# Patient Record
Sex: MALE | Race: WHITE | NOT HISPANIC OR LATINO | Employment: FULL TIME | URBAN - METROPOLITAN AREA
[De-identification: names, ages, dates, MRNs, and addresses within clinical notes are randomized per-mention and may not be internally consistent; named-entity substitution may affect disease eponyms.]

---

## 2019-06-18 DIAGNOSIS — M79.642 LEFT HAND PAIN: Primary | ICD-10-CM

## 2019-06-19 ENCOUNTER — OFFICE VISIT (OUTPATIENT)
Dept: ORTHOPEDICS | Facility: CLINIC | Age: 34
End: 2019-06-19
Payer: COMMERCIAL

## 2019-06-19 ENCOUNTER — HOSPITAL ENCOUNTER (OUTPATIENT)
Dept: RADIOLOGY | Facility: HOSPITAL | Age: 34
Discharge: HOME | End: 2019-06-19
Attending: ORTHOPAEDIC SURGERY
Payer: COMMERCIAL

## 2019-06-19 ENCOUNTER — TELEPHONE (OUTPATIENT)
Dept: ORTHOPEDICS | Facility: CLINIC | Age: 34
End: 2019-06-19

## 2019-06-19 VITALS
WEIGHT: 174 LBS | HEIGHT: 71 IN | DIASTOLIC BLOOD PRESSURE: 83 MMHG | BODY MASS INDEX: 24.36 KG/M2 | HEART RATE: 84 BPM | SYSTOLIC BLOOD PRESSURE: 129 MMHG

## 2019-06-19 DIAGNOSIS — S63.592A SPRAIN OF ULNAR COLLATERAL LIGAMENT OF LEFT WRIST, INITIAL ENCOUNTER: Primary | ICD-10-CM

## 2019-06-19 DIAGNOSIS — M79.642 LEFT HAND PAIN: ICD-10-CM

## 2019-06-19 PROCEDURE — 99204 OFFICE O/P NEW MOD 45 MIN: CPT | Mod: S$GLB,,, | Performed by: ORTHOPAEDIC SURGERY

## 2019-06-19 PROCEDURE — 73130 XR HAND COMPLETE 3 VIEW LEFT: ICD-10-PCS | Mod: 26,LT,, | Performed by: RADIOLOGY

## 2019-06-19 PROCEDURE — 99204 PR OFFICE/OUTPT VISIT, NEW, LEVL IV, 45-59 MIN: ICD-10-PCS | Mod: S$GLB,,, | Performed by: ORTHOPAEDIC SURGERY

## 2019-06-19 PROCEDURE — 73130 X-RAY EXAM OF HAND: CPT | Mod: TC,LT

## 2019-06-19 PROCEDURE — 99999 PR PBB SHADOW E&M-EST. PATIENT-LVL III: CPT | Mod: PBBFAC,,, | Performed by: ORTHOPAEDIC SURGERY

## 2019-06-19 PROCEDURE — 99999 PR PBB SHADOW E&M-EST. PATIENT-LVL III: ICD-10-PCS | Mod: PBBFAC,,, | Performed by: ORTHOPAEDIC SURGERY

## 2019-06-19 PROCEDURE — 3008F PR BODY MASS INDEX (BMI) DOCUMENTED: ICD-10-PCS | Mod: CPTII,S$GLB,, | Performed by: ORTHOPAEDIC SURGERY

## 2019-06-19 PROCEDURE — 3008F BODY MASS INDEX DOCD: CPT | Mod: CPTII,S$GLB,, | Performed by: ORTHOPAEDIC SURGERY

## 2019-06-19 PROCEDURE — 73130 X-RAY EXAM OF HAND: CPT | Mod: 26,LT,, | Performed by: RADIOLOGY

## 2019-06-19 RX ORDER — MELOXICAM 15 MG/1
15 TABLET ORAL DAILY
Qty: 30 TABLET | Refills: 2 | Status: SHIPPED | OUTPATIENT
Start: 2019-06-19 | End: 2020-02-14

## 2019-06-19 RX ORDER — FINASTERIDE 5 MG/1
TABLET, FILM COATED ORAL
COMMUNITY
Start: 2019-03-20 | End: 2022-02-24 | Stop reason: DRUGHIGH

## 2019-06-19 NOTE — TELEPHONE ENCOUNTER
Pt called to clarify which pharmacy his medication went to. I informed the pt that his medications were located at our pharmacy [facility] . PT understood.

## 2019-06-19 NOTE — PROGRESS NOTES
Subjective:     Patient ID: Tyrel Rogers is a 34 y.o. male.    Chief Complaint: No chief complaint on file.    PT stated the he was out on his 5/19/2019 he hurt his hand when falling on it. Taking anti-inflammatories without significant improvement.    Hand Pain    The pain is present in the left hand. This is a new problem. The current episode started 1 to 4 weeks ago. The injury was the result of a pushing and falling action The problem occurs intermittently. The problem has been resolved. The quality of the pain is described as aching, sharp, shooting and throbbing. The pain is at a severity of 5/10. Associated symptoms include a limited range of motion and numbness. Pertinent negatives include no fever. The symptoms are aggravated by activity, rotation, twisting, flexion, bending and contact. He has tried OTC pain meds for the symptoms. The treatment provided mild relief. Physical therapy was not tried.      History reviewed. No pertinent past medical history.  History reviewed. No pertinent surgical history.  History reviewed. No pertinent family history.  Social History     Socioeconomic History    Marital status: Single     Spouse name: Not on file    Number of children: Not on file    Years of education: Not on file    Highest education level: Not on file   Occupational History    Not on file   Social Needs    Financial resource strain: Not on file    Food insecurity:     Worry: Not on file     Inability: Not on file    Transportation needs:     Medical: Not on file     Non-medical: Not on file   Tobacco Use    Smoking status: Never Smoker    Smokeless tobacco: Never Used   Substance and Sexual Activity    Alcohol use: Not Currently    Drug use: Never    Sexual activity: Not Currently   Lifestyle    Physical activity:     Days per week: Not on file     Minutes per session: Not on file    Stress: Not on file   Relationships    Social connections:     Talks on phone: Not on file      Gets together: Not on file     Attends Christian service: Not on file     Active member of club or organization: Not on file     Attends meetings of clubs or organizations: Not on file     Relationship status: Not on file   Other Topics Concern    Not on file   Social History Narrative    Not on file       Review of patient's allergies indicates:  No Known Allergies  Review of Systems   Constitution: Negative for fever and night sweats.   HENT: Negative for hearing loss.    Eyes: Negative for blurred vision and visual disturbance.   Cardiovascular: Negative for chest pain and leg swelling.   Respiratory: Negative for shortness of breath.    Endocrine: Negative for polyuria.   Hematologic/Lymphatic: Negative for bleeding problem.   Skin: Negative for rash.   Musculoskeletal: Negative for back pain, joint pain, joint swelling, muscle cramps and muscle weakness.   Gastrointestinal: Negative for melena.   Genitourinary: Negative for hematuria.   Neurological: Positive for numbness. Negative for loss of balance and paresthesias.   Psychiatric/Behavioral: Negative for altered mental status.       Objective:   Body mass index is 24.27 kg/m².  Vitals:    06/19/19 1504   BP: 129/83   Pulse: 84           General    Vitals reviewed.  Constitutional: He is oriented to person, place, and time. He appears well-developed and well-nourished. No distress.   HENT:   Right Ear: External ear normal.   Left Ear: External ear normal.   Nose: Nose normal.   Eyes: Pupils are equal, round, and reactive to light. Right eye exhibits no discharge. Left eye exhibits no discharge.   Neck: Normal range of motion.   Pulmonary/Chest: Effort normal. No respiratory distress.   Abdominal: Soft.   Neurological: He is alert and oriented to person, place, and time. No cranial nerve deficit. He exhibits normal muscle tone. Coordination normal.   Psychiatric: He has a normal mood and affect. His behavior is normal. Judgment and thought content normal.              Right Hand/Wrist Exam     Inspection   Scars: Wrist - absent   Effusion: Wrist - absent   Bruising: Wrist - absent   Deformity: Wrist - deformity     Range of Motion     Wrist   Extension:  50 normal   Flexion:  70 normal   Abduction: 20 normal  Adduction: 35 normal    Tests   Phalens Sign: negative  Tinel's sign (median nerve): negative  Finkelstein's test: negative  Carpal Tunnel Compression Test: negative  Cubital Tunnel Compression Test: negative      Other     Neuorologic Exam    Median Distribution: normal  Ulnar Distribution: normal  Radial Distribution: normal      Left Hand/Wrist Exam     Inspection   Scars: Wrist - absent   Effusion: Wrist - absent   Bruising: Wrist - absent   Deformity: Wrist - absent     Range of Motion     Wrist   Extension:  50 normal   Flexion:  70 normal   Abduction: 20 normal  Adduction: 35 normal    Tests   Phalens Sign: negative  Tinel's sign (median nerve): negative  Finkelstein's test: negative  Carpal Tunnel Compression Test: negative  Cubital Tunnel Compression Test: negative      Other     Sensory Exam  Median Distribution: normal  Ulnar Distribution: normal  Radial Distribution: normal    Comments:  Tender to palpation over ulnar collateral ligament of the thumb.  Positive pain with stress      Right Elbow Exam     Inspection   Scars: absent  Effusion: absent  Bruising: absent  Deformity: absent  Atrophy: absent    Range of Motion   Extension:  0 normal   Flexion:  130 normal   Pronation: normal   Supination:  80 normal     Tests   Varus: negative  Valgus: negative  Tinel's sign (cubital tunnel): negative  Tennis Elbow: negative  Golfer's Elbow: negative  Radial Capitellar Grind: negative    Other   Sensation: normal      Left Elbow Exam     Inspection   Scars: absent  Effusion: absent  Bruising: absent  Deformity: absent  Atrophy: absent    Range of Motion   Extension:  0 normal   Flexion:  130 normal   Pronation: normal   Supination:  80 normal     Tests    Varus: negative  Tinel's sign (cubital tunnel): negative  Tennis Elbow: negative  Golfer's Elbow: negative  Radial Capitellar Grind: negative    Other   Sensation: normal        Muscle Strength   Right Upper Extremity   Wrist extension: 5/5/5   Wrist flexion: 5/5/5   : 5/5/5   Pinch Mechanism: 5/5  Elbow Pronation:  5/5   Elbow Supination:  5/5   Elbow Extension: 5/5  Elbow Flexion: 5/5  Intrinsics: 5/5  EPL (Extensor Pollicis Longus): 5/5  Left Upper Extremity  Wrist extension: 5/5/5   Wrist flexion: 5/5/5   :  5/5/5   Pinch Mechanism: 5/5  Elbow Pronation:  5/5   Elbow Supination:  5/5   Elbow Extension: 5/5  Elbow Flexion: 5/5  Intrinsics: 5/5  EPL (Extensor Pollicis Longus): 5/5    Vascular Exam     Right Pulses      Radial:                    2+      Left Pulses      Radial:                    2+      Capillary Refill  Right Hand: normal capillary refill  Left Hand: normal capillary refill    Edema  Right Forearm: absent  Left Forearm: absent      Imaging reviewed and interpreted today by me X-Ray Hand Complete Left  Narrative: EXAMINATION:  XR HAND COMPLETE 3 VIEW LEFT    CLINICAL HISTORY:  . Pain in left hand    TECHNIQUE:  PA, lateral, and oblique views of the left hand were performed.    COMPARISON:  None    FINDINGS:  No acute osseous or soft tissue abnormality.  Impression: As above    Electronically signed by: Felton Huitron MD  Date:    06/19/2019  Time:    14:38      Assessment:     Encounter Diagnosis   Name Primary?    Sprain of ulnar collateral ligament of left wrist, initial encounter Yes        Plan:     -MRI left hand to evaluate UCL  -thumb spica splint  -Mobic      Disclaimer: This note was prepared using a voice recognition system and is likely to have sound alike errors within the text.

## 2019-07-02 ENCOUNTER — TELEPHONE (OUTPATIENT)
Dept: RADIOLOGY | Facility: HOSPITAL | Age: 34
End: 2019-07-02

## 2019-07-02 ENCOUNTER — TELEPHONE (OUTPATIENT)
Dept: ORTHOPEDICS | Facility: CLINIC | Age: 34
End: 2019-07-02

## 2019-08-12 ENCOUNTER — OFFICE VISIT (OUTPATIENT)
Dept: URGENT CARE | Facility: CLINIC | Age: 34
End: 2019-08-12
Payer: COMMERCIAL

## 2019-08-12 VITALS
HEART RATE: 91 BPM | SYSTOLIC BLOOD PRESSURE: 136 MMHG | WEIGHT: 174 LBS | HEIGHT: 71 IN | BODY MASS INDEX: 24.36 KG/M2 | OXYGEN SATURATION: 97 % | RESPIRATION RATE: 16 BRPM | TEMPERATURE: 98 F | DIASTOLIC BLOOD PRESSURE: 94 MMHG

## 2019-08-12 DIAGNOSIS — S20.212A CONTUSION OF RIB ON LEFT SIDE, INITIAL ENCOUNTER: ICD-10-CM

## 2019-08-12 DIAGNOSIS — S22.42XA CLOSED FRACTURE OF MULTIPLE RIBS OF LEFT SIDE, INITIAL ENCOUNTER: Primary | ICD-10-CM

## 2019-08-12 DIAGNOSIS — T14.90XA INJURY: ICD-10-CM

## 2019-08-12 DIAGNOSIS — R07.81 RIB PAIN: ICD-10-CM

## 2019-08-12 PROCEDURE — 71100 X-RAY EXAM RIBS UNI 2 VIEWS: CPT | Mod: LT,S$GLB,, | Performed by: RADIOLOGY

## 2019-08-12 PROCEDURE — 71100 XR RIBS 2 VIEW LEFT: ICD-10-PCS | Mod: LT,S$GLB,, | Performed by: RADIOLOGY

## 2019-08-12 PROCEDURE — 99203 PR OFFICE/OUTPT VISIT, NEW, LEVL III, 30-44 MIN: ICD-10-PCS | Mod: S$GLB,,, | Performed by: NURSE PRACTITIONER

## 2019-08-12 PROCEDURE — 99203 OFFICE O/P NEW LOW 30 MIN: CPT | Mod: S$GLB,,, | Performed by: NURSE PRACTITIONER

## 2019-08-12 RX ORDER — NAPROXEN 500 MG/1
500 TABLET ORAL 2 TIMES DAILY WITH MEALS
Qty: 20 TABLET | Refills: 0 | Status: SHIPPED | OUTPATIENT
Start: 2019-08-12 | End: 2019-08-22

## 2019-08-12 RX ORDER — METRONIDAZOLE 10 MG/G
GEL TOPICAL
COMMUNITY
Start: 2019-02-26 | End: 2020-02-14

## 2019-08-12 NOTE — PROGRESS NOTES
"Subjective:       Patient ID: Tyrel Rogers is a 34 y.o. male.    Vitals:    08/12/19 1838   BP: (!) 136/94   Pulse: 91   Resp: 16   Temp: 97.5 °F (36.4 °C)   TempSrc: Oral   SpO2: 97%   Weight: 78.9 kg (174 lb)   Height: 5' 11" (1.803 m)       Chief Complaint: Rib Injury (Left Ribs)    Patient reports 4 days ago he was doing a tactic defense training through work and had to be maced and then tackled and injured his left ribs with residual pain and swelling.  No bruising.  No history of previous injury.  Works [employer].  Denies shortness of breath, fever, or cough.    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  [facility]    Chest Pain    This is a new problem. Episode onset: 4 days ago. The problem occurs intermittently. The pain is at a severity of 7/10. The quality of the pain is described as sharp and pressure. The pain does not radiate. Pertinent negatives include no abdominal pain, back pain, claudication, cough, diaphoresis, dizziness, exertional chest pressure, fever, headaches, hemoptysis, irregular heartbeat, leg pain, lower extremity edema, malaise/fatigue, nausea, near-syncope, numbness, orthopnea, palpitations, PND, shortness of breath, sputum production, syncope, vomiting or weakness. He has tried acetaminophen (ice) for the symptoms.     Review of Systems   Constitution: Negative for chills, diaphoresis, fever and malaise/fatigue.   HENT: Negative for sore throat.    Eyes: Negative for blurred vision.   Cardiovascular: Positive for chest pain. Negative for claudication, irregular heartbeat, near-syncope, orthopnea, palpitations, paroxysmal nocturnal dyspnea and syncope.   Respiratory: Negative for cough, hemoptysis, shortness of breath and sputum production.    Skin: Negative for rash.   Musculoskeletal: Negative for back pain and joint pain.        Left rib pain   Gastrointestinal: Negative for " abdominal pain, diarrhea, nausea and vomiting.   Neurological: Negative for dizziness, headaches, numbness and weakness.   Psychiatric/Behavioral: The patient is not nervous/anxious.        Objective:      Physical Exam   Constitutional: He is oriented to person, place, and time. He appears well-developed and well-nourished. He is cooperative.  Non-toxic appearance. He does not appear ill. No distress.   HENT:   Head: Normocephalic and atraumatic.   Right Ear: Hearing, tympanic membrane, external ear and ear canal normal.   Left Ear: Hearing, tympanic membrane, external ear and ear canal normal.   Nose: Nose normal. No mucosal edema, rhinorrhea or nasal deformity. No epistaxis. Right sinus exhibits no maxillary sinus tenderness and no frontal sinus tenderness. Left sinus exhibits no maxillary sinus tenderness and no frontal sinus tenderness.   Mouth/Throat: Uvula is midline, oropharynx is clear and moist and mucous membranes are normal. No trismus in the jaw. Normal dentition. No uvula swelling. No posterior oropharyngeal erythema.   Eyes: Conjunctivae and lids are normal. Right eye exhibits no discharge. Left eye exhibits no discharge. No scleral icterus.   Sclera clear bilat   Neck: Trachea normal, normal range of motion, full passive range of motion without pain and phonation normal. Neck supple.   Cardiovascular: Normal rate, regular rhythm, normal heart sounds, intact distal pulses and normal pulses.   Pulmonary/Chest: Effort normal and breath sounds normal. No respiratory distress. He has no wheezes. Chest wall is not dull to percussion. He exhibits tenderness and bony tenderness. He exhibits no mass, no laceration, no crepitus, no edema, no deformity, no swelling and no retraction.       Abdominal: Soft. Normal appearance and bowel sounds are normal. He exhibits no distension, no pulsatile midline mass and no mass. There is no tenderness.   Musculoskeletal: Normal range of motion. He exhibits no edema or  deformity.   Neurological: He is alert and oriented to person, place, and time. He exhibits normal muscle tone. Coordination normal.   Skin: Skin is warm, dry and intact. He is not diaphoretic. No pallor.   Psychiatric: He has a normal mood and affect. His speech is normal and behavior is normal. Judgment and thought content normal. Cognition and memory are normal.   Nursing note and vitals reviewed.      X-ray Ribs 2 View Left    Result Date: 8/12/2019  EXAMINATION: XR RIBS 2 VIEW LEFT CLINICAL HISTORY: Pleurodynia TECHNIQUE: Two views of the left ribs were performed. COMPARISON: None. FINDINGS: Four views. Remote appearing fractures noted of left posterolateral ribs 5 and 6.  No acute displaced fracture or dislocation of the visualized ribs.  The left lung zones are grossly clear.     1. No acute displaced fracture or dislocation of the left ribs noting remote fractures of left posterolateral ribs 5 and 6. Electronically signed by: Patricio Mario MD Date:    08/12/2019 Time:    19:01  Assessment:       1. Closed fracture of multiple ribs of left side, initial encounter    2. Rib pain    3. Injury    4. Contusion of rib on left side, initial encounter        Plan:       Tyrel was seen today for rib injury.    Diagnoses and all orders for this visit:    Closed fracture of multiple ribs of left side, initial encounter  -     naproxen (NAPROSYN) 500 MG tablet; Take 1 tablet (500 mg total) by mouth 2 (two) times daily with meals. for 10 days    Rib pain  -     X-Ray Ribs 2 View Left; Future    Injury  -     X-Ray Ribs 2 View Left; Future    Contusion of rib on left side, initial encounter      Patient Instructions   You have been seen for a work-related injury/ailment. You are released to go home and you need to follow up with [facility] on the next business day.  Please call [phone] for help setting up the appointment.  Please drink plenty of fluids.  Please  get plenty of rest.  Do not wrap ribs.  Ice to the area as directed.  Please return here or go to the Emergency Department for any concerns or worsening of condition.    If you were not prescribed an anti-inflammatory medication, and if you do not have any history of stomach/intestinal ulcers, or kidney disease, or are not taking a blood thinner such as Coumadin, Plavix, Pradaxa, Eloquis, or Xaralta for example, it is OK to take over the counter Ibuprofen or Advil or Motrin or Aleve as directed.  Do not take these medications on an empty stomach.  Rest, ice, compression and elevation to the affected joint or limb as needed.    If you  smoke, please stop smoking.  Rib Fracture (Broken Rib)  Your ribs are curved bones in your chest. They help protect your lungs and expand and contract when you breathe. Children's ribs bend easily and can often withstand a blow or fall. But adult ribs are more likely to break (fracture) under stress. Even coughing or a hard sneeze can fracture a rib.    When to go to the Emergency Room (ER)  Although they can be painful, most rib fractures aren't serious. But they often make it hard to cough or breathe deeply. Get medical care right away if you have:  · Trouble breathing.  · Nausea, vomiting, or stomach pain with a sore or bruised rib.  · Pain that worsens over time.  · An injury to the chest or stomach.  What to expect in the ER  Here is what will happen in the ER:   · A healthcare provider will ask about your injury and examine you carefully.  · An X-ray of your chest will likely be taken to show any major damage to ribs and lungs. However, ribs can undergo small breaks that do not show up on X-rays, even though they still hurt.  · You may be given medicine to ease your discomfort.  · Rarely, rib fractures can cause a lung to collapse or lead to bleeding in the chest. In these cases, a tube will be inserted into the chest to reinflate the lung or drain the blood.  Follow-up  You are  likely to heal in 6 to 8 weeks. Most rib fractures heal on their own with no lasting effects. Call your healthcare provider right away if you notice any of these symptoms:  · Increased chest pain  · Shortness of breath  · Fever  · Coughing up blood  Date Last Reviewed: 9/26/2015  © 5900-9140 [employer]. [address] [place] [address], [place] [address]. All rights reserved. This information is not intended as a substitute for professional medical care. Always follow your healthcare professional's instructions.

## 2019-08-13 NOTE — PATIENT INSTRUCTIONS
You have been seen for a work-related injury/ailment. You are released to go home and you need to follow up with [facility] on the next business day.  Please call [phone] for help setting up the appointment.  Please drink plenty of fluids.  Please get plenty of rest.  Do not wrap ribs.  Ice to the area as directed.  Please return here or go to the Emergency Department for any concerns or worsening of condition.    If you were not prescribed an anti-inflammatory medication, and if you do not have any history of stomach/intestinal ulcers, or kidney disease, or are not taking a blood thinner such as Coumadin, Plavix, Pradaxa, Eloquis, or Xaralta for example, it is OK to take over the counter Ibuprofen or Advil or Motrin or Aleve as directed.  Do not take these medications on an empty stomach.  Rest, ice, compression and elevation to the affected joint or limb as needed.    If you  smoke, please stop smoking.  Rib Fracture (Broken Rib)  Your ribs are curved bones in your chest. They help protect your lungs and expand and contract when you breathe. Children's ribs bend easily and can often withstand a blow or fall. But adult ribs are more likely to break (fracture) under stress. Even coughing or a hard sneeze can fracture a rib.    When to go to the Emergency Room (ER)  Although they can be painful, most rib fractures aren't serious. But they often make it hard to cough or breathe deeply. Get medical care right away if you have:  · Trouble breathing.  · Nausea, vomiting, or stomach pain with a sore or bruised rib.  · Pain that worsens over time.  · An injury to the chest or stomach.  What to expect in the ER  Here is what will happen in the ER:   · A healthcare provider will ask about your injury and examine you carefully.  · An X-ray of your chest will likely be taken to show any major damage to ribs and lungs. However, ribs can undergo small breaks that do not show up  on X-rays, even though they still hurt.  · You may be given medicine to ease your discomfort.  · Rarely, rib fractures can cause a lung to collapse or lead to bleeding in the chest. In these cases, a tube will be inserted into the chest to reinflate the lung or drain the blood.  Follow-up  You are likely to heal in 6 to 8 weeks. Most rib fractures heal on their own with no lasting effects. Call your healthcare provider right away if you notice any of these symptoms:  · Increased chest pain  · Shortness of breath  · Fever  · Coughing up blood  Date Last Reviewed: 9/26/2015  © 4840-4844 [employer]. [address] [place] [address], [place] [address]. All rights reserved. This information is not intended as a substitute for professional medical care. Always follow your healthcare professional's instructions.

## 2020-02-13 DIAGNOSIS — M25.561 RIGHT KNEE PAIN, UNSPECIFIED CHRONICITY: Primary | ICD-10-CM

## 2020-02-14 ENCOUNTER — OFFICE VISIT (OUTPATIENT)
Dept: ORTHOPEDICS | Facility: CLINIC | Age: 35
End: 2020-02-14
Payer: COMMERCIAL

## 2020-02-14 ENCOUNTER — HOSPITAL ENCOUNTER (OUTPATIENT)
Dept: RADIOLOGY | Facility: HOSPITAL | Age: 35
Discharge: HOME | End: 2020-02-14
Attending: ORTHOPAEDIC SURGERY
Payer: COMMERCIAL

## 2020-02-14 VITALS
HEIGHT: 71 IN | BODY MASS INDEX: 24.36 KG/M2 | DIASTOLIC BLOOD PRESSURE: 89 MMHG | WEIGHT: 174 LBS | SYSTOLIC BLOOD PRESSURE: 131 MMHG | HEART RATE: 83 BPM

## 2020-02-14 DIAGNOSIS — M76.51 PATELLAR TENDINITIS OF RIGHT KNEE: Primary | ICD-10-CM

## 2020-02-14 DIAGNOSIS — M25.561 RIGHT KNEE PAIN, UNSPECIFIED CHRONICITY: ICD-10-CM

## 2020-02-14 DIAGNOSIS — S63.592A SPRAIN OF ULNAR COLLATERAL LIGAMENT OF LEFT WRIST, INITIAL ENCOUNTER: ICD-10-CM

## 2020-02-14 PROCEDURE — 99999 PR PBB SHADOW E&M-EST. PATIENT-LVL III: ICD-10-PCS | Mod: PBBFAC,,, | Performed by: ORTHOPAEDIC SURGERY

## 2020-02-14 PROCEDURE — 99214 PR OFFICE/OUTPT VISIT, EST, LEVL IV, 30-39 MIN: ICD-10-PCS | Mod: S$GLB,,, | Performed by: ORTHOPAEDIC SURGERY

## 2020-02-14 PROCEDURE — 73562 XR KNEE ORTHO RIGHT WITH FLEXION: ICD-10-PCS | Mod: 26,LT,, | Performed by: RADIOLOGY

## 2020-02-14 PROCEDURE — 73562 X-RAY EXAM OF KNEE 3: CPT | Mod: 26,LT,, | Performed by: RADIOLOGY

## 2020-02-14 PROCEDURE — 3008F PR BODY MASS INDEX (BMI) DOCUMENTED: ICD-10-PCS | Mod: CPTII,S$GLB,, | Performed by: ORTHOPAEDIC SURGERY

## 2020-02-14 PROCEDURE — 73564 XR KNEE ORTHO RIGHT WITH FLEXION: ICD-10-PCS | Mod: 26,RT,, | Performed by: RADIOLOGY

## 2020-02-14 PROCEDURE — 99214 OFFICE O/P EST MOD 30 MIN: CPT | Mod: S$GLB,,, | Performed by: ORTHOPAEDIC SURGERY

## 2020-02-14 PROCEDURE — 73562 X-RAY EXAM OF KNEE 3: CPT | Mod: 59,TC,LT

## 2020-02-14 PROCEDURE — 73564 X-RAY EXAM KNEE 4 OR MORE: CPT | Mod: 26,RT,, | Performed by: RADIOLOGY

## 2020-02-14 PROCEDURE — 3008F BODY MASS INDEX DOCD: CPT | Mod: CPTII,S$GLB,, | Performed by: ORTHOPAEDIC SURGERY

## 2020-02-14 PROCEDURE — 99999 PR PBB SHADOW E&M-EST. PATIENT-LVL III: CPT | Mod: PBBFAC,,, | Performed by: ORTHOPAEDIC SURGERY

## 2020-02-14 RX ORDER — MELOXICAM 15 MG/1
15 TABLET ORAL DAILY
Qty: 30 TABLET | Refills: 2 | Status: SHIPPED | OUTPATIENT
Start: 2020-02-14 | End: 2022-04-06

## 2020-02-14 NOTE — PROGRESS NOTES
Subjective:     Patient ID: Tyrel Rogers is a 34 y.o. male.    Chief Complaint: No chief complaint on file.    Tyrel Rogers, a 34 y.o. MALE presents today with a new problem of RIGHT knee pain. Patient is very athletic and he states that when he runs a bruise pops out of his knee. Patient states he went to [facility] on 2/3/2020. Patient states the pain is only bothersome when he is doing his normal routine of exercise.   Was in the [employer] had some bruising after some running/PT.    Knee Pain    The pain is present in the right knee. This is a recurrent problem. The current episode started more than 1 month ago. The problem occurs intermittently. The problem has been gradually worsening. The quality of the pain is described as aching and sharp. The pain is at a severity of 4/10. Associated symptoms include joint swelling, a limited range of motion and stiffness. Pertinent negatives include no fever or numbness. The symptoms are aggravated by activity, lifting, twisting, bending and exercise. He has tried cold for the symptoms. The treatment provided no relief. Physical therapy was not tried.      Past Medical History:   Diagnosis Date    Asthma      Past Surgical History:   Procedure Laterality Date    TONSILLECTOMY       Family History   Adopted: Yes     Social History     Socioeconomic History    Marital status: Single     Spouse name: Not on file    Number of children: Not on file    Years of education: Not on file    Highest education level: Not on file   Occupational History    Not on file   Social Needs    Financial resource strain: Not on file    Food insecurity:     Worry: Not on file     Inability: Not on file    Transportation needs:     Medical: Not on file     Non-medical: Not on file   Tobacco Use    Smoking status: Never Smoker    Smokeless tobacco: Never Used   Substance and Sexual Activity    Alcohol use: Not Currently    Drug use: Never    Sexual  activity: Not Currently   Lifestyle    Physical activity:     Days per week: Not on file     Minutes per session: Not on file    Stress: Not on file   Relationships    Social connections:     Talks on phone: Not on file     Gets together: Not on file     Attends [employer] service: Not on file     Active member of club or organization: Not on file     Attends meetings of clubs or organizations: Not on file     Relationship status: Not on file   Other Topics Concern    Not on file   Social History Narrative    Not on file     Medication List with Changes/Refills   Current Medications    FINASTERIDE (PROSCAR) 5 MG TABLET    TAKE 1/4 TABLET BY MOUTH ONCE A DAY   Changed and/or Refilled Medications    Modified Medication Previous Medication    MELOXICAM (MOBIC) 15 MG TABLET meloxicam (MOBIC) 15 MG tablet       Take 1 tablet (15 mg total) by mouth once daily.    Take 1 tablet (15 mg total) by mouth once daily.   Discontinued Medications    BRIMONIDINE (MIRVASO) 0.33 % GEL    Apply to face in the morning    METRONIDAZOLE 1% (METROGEL) 1 % GEL    Apply to face at bed time     Review of patient's allergies indicates:  No Known Allergies  Review of Systems   Constitution: Negative for chills and fever.   HENT: Negative for ear discharge and hearing loss.    Eyes: Negative for blurred vision and visual disturbance.   Cardiovascular: Negative for chest pain and leg swelling.   Respiratory: Negative for cough and shortness of breath.    Endocrine: Negative for polyuria.   Hematologic/Lymphatic: Negative for bleeding problem.   Skin: Negative for rash.   Musculoskeletal: Positive for joint pain and stiffness. Negative for back pain, joint swelling, muscle cramps and muscle weakness.   Gastrointestinal: Negative for nausea and vomiting.   Genitourinary: Negative for hematuria.   Neurological: Negative for loss of balance, numbness and paresthesias.   Psychiatric/Behavioral: Negative for altered mental status.       Objective:    Body mass index is 24.27 kg/m².  Vitals:    02/14/20 0756   BP: 131/89   Pulse: 83                General    Vitals reviewed.  Constitutional: He is oriented to person, place, and time. He appears well-developed and well-nourished. No distress.   HENT:   Mouth/Throat: No oropharyngeal exudate.   Eyes: Right eye exhibits no discharge. Left eye exhibits no discharge.   Neck: Normal range of motion.   Pulmonary/Chest: Effort normal. No respiratory distress.   Neurological: He is alert and oriented to person, place, and time.   Psychiatric: He has a normal mood and affect. His behavior is normal. Judgment and thought content normal.           Right Knee Exam     Inspection   Erythema: absent  Scars: absent  Swelling: absent  Effusion: absent  Deformity: absent  Bruising: absent    Tenderness   The patient is tender to palpation of the patellar tendon.    Range of Motion   Extension: 0   Flexion: 140     Tests   Meniscus   Cookie:  Medial - negative Lateral - negative  Ligament Examination Lachman: normal (-1 to 2mm) PCL-Posterior Drawer: normal (0 to 2mm)     MCL - Valgus: normal (0 to 2mm)  LCL - Varus: normal  Patella   Patellar apprehension: negative  Passive Patellar Tilt: neutral  Patellar Tracking: normal  Patellar Glide (quadrants): Lateral - 1   Medial - 2  Q-Angle at 90 degrees: normal  Patellar Grind: negative    Other   Meniscal Cyst: absent  Popliteal (Baker's) Cyst: absent  Sensation: normal    Comments:  +bridge  +stepdown    Left Knee Exam     Inspection   Erythema: absent  Scars: absent  Swelling: absent  Effusion: absent  Deformity: absent  Bruising: absent    Tenderness   The patient is experiencing no tenderness.     Range of Motion   Extension: 0   Flexion: 140     Tests   Meniscus   Cookie:  Medial - negative Lateral - negative  Stability Lachman: normal (-1 to 2mm) PCL-Posterior Drawer: normal (0 to 2mm)  MCL - Valgus: normal (0 to 2mm)  LCL - Varus: normal (0 to 2mm)  Patella   Patellar  apprehension: negative  Passive Patellar Tilt: neutral  Patellar Tracking: normal  Patellar Glide (Quadrants): Lateral - 1 Medial - 2  Q-Angle at 90 degrees: normal  Patellar Grind: negative    Other   Meniscal Cyst: absent  Popliteal (Baker's) Cyst: absent  Sensation: normal    Right Hip Exam     Tests   Robert: negative  Left Hip Exam     Tests   Robert: negative          Muscle Strength   Right Lower Extremity   Hip Abduction: 5/5   Quadriceps:  4/5   Hamstrin/5   Left Lower Extremity   Hip Abduction: 5/5   Quadriceps:  5/5   Hamstrin/5     Reflexes     Left Side  Quadriceps:  2+  Achilles:  2+    Right Side   Quadriceps:  2+  Achilles:  2+    Vascular Exam     Right Pulses  Dorsalis Pedis:      2+  Posterior Tibial:      2+        Left Pulses  Dorsalis Pedis:      2+  Posterior Tibial:      2+            Relevant imaging results reviewed and interpreted by me, discussed with the patient and / or family today   X-ray Knee Ortho Right with Flexion  Narrative: EXAMINATION:  XR KNEE ORTHO RIGHT WITH FLEXION    CLINICAL HISTORY:  Pain in right knee    TECHNIQUE:  Standing AP, standing PA flexion, and Merchant views were obtained of the bilateral knees.  Standing lateral view of the right knee was obtained.    COMPARISON:  None.    FINDINGS:  There is no radiographic evidence of acute osseous, articular, or soft tissue abnormality.  Joint spaces are preserved.  Impression: No acute findings    Electronically signed by: Anuel Perez MD  Date:    2020  Time:    08:02     Assessment:     Encounter Diagnoses   Name Primary?    Patellar tendinitis of right knee Yes    Sprain of ulnar collateral ligament of left wrist, initial encounter         Plan:     We reviewed with Tyrel today, the pathology and natural history of his diagnosis. We had an extensive discussion as to the conservative treatment and management of their condition. We also discussed the variety of treatment options to include  medication, physical therapy, diagnostic testing as well as other treatments.The decision was made to go forward with:    Discussed core strengthening  PT  Mobic  F/up 8wks            Disclaimer: This note was prepared using a voice recognition system and is likely to have sound alike errors within the text.

## 2020-02-14 NOTE — PATIENT INSTRUCTIONS
If you are experiencing pain/discomfort or have questions after 5pm and would like to be connected to the [facility] on call team, please call this number [phone] and specify which Orthopedics/Sports Medicine provider is treating you.

## 2020-02-19 ENCOUNTER — CLINICAL SUPPORT (OUTPATIENT)
Dept: REHABILITATION | Facility: HOSPITAL | Age: 35
End: 2020-02-19
Attending: ORTHOPAEDIC SURGERY
Payer: COMMERCIAL

## 2020-02-19 DIAGNOSIS — M25.561 ACUTE PAIN OF RIGHT KNEE: Primary | ICD-10-CM

## 2020-02-19 DIAGNOSIS — M76.51 PATELLAR TENDINITIS OF RIGHT KNEE: ICD-10-CM

## 2020-02-19 PROCEDURE — 97161 PT EVAL LOW COMPLEX 20 MIN: CPT

## 2020-02-19 PROCEDURE — 97110 THERAPEUTIC EXERCISES: CPT

## 2020-02-19 NOTE — PLAN OF CARE
[facility]  Physical Therapy Initial Evaluation    Name: Tyrel Rogers  Clinic Number: [id number]    Therapy Diagnosis:   Encounter Diagnoses   Name Primary?    Acute pain of right knee Yes    Patellar tendinitis of right knee      Physician: Davey Oconnor, *    Physician Orders: PT Eval and Treat   Medical Diagnosis from Referral: M76.51 (ICD-10-CM) - Patellar tendinitis of right knee  Evaluation Date: 2/19/2020  Authorization Period Expiration: 2/13/2021  Plan of Care Expiration: 4/19/2020  Visit # / Visits authorized: 1/1    Time In: 10:30am  Time Out: 11:15am  Total Billable Time: 15 minutes    Precautions: Standard    Surgery: None    Subjective   Date of onset: November   History of current condition - Vamsi reports: He has been having knee pain since back in November. He states he finished the [employer] and thought maybe it was just overuse but it never got better. He states he really likes to run but it has started to bother him while running. He states it doesn't prevent him from running but it is aggravating. He states he was working but he has stopped due to the pain. He states he runs around 4 to 5 times a week and usually around 3 to 4 miles each time. He states when he sits and rest it doesn't bother him at all. He states it bothers him the most when he twist on his knee. He states it does pop sometimes when he squats. He denies any numbness or tingling into the leg.      Medical History:   Past Medical History:   Diagnosis Date    Asthma        Surgical History:   Tyrel Rogers  has a past surgical history that includes Tonsillectomy.    Medications:   Tyrel has a current medication list which includes the following prescription(s): finasteride and meloxicam.    Allergies:   Review of patient's allergies indicates:  No Known Allergies     Imaging, x-ray: There is no radiographic evidence of acute osseous, articular, or soft tissue  abnormality.  Joint spaces are preserved.    Prior Therapy: no  Social History: lives alone  Occupation: [employer] for [employer]   Prior Level of Function: running, jumping, working out, squatting   Current Level of Function: running, not working    Pain:  Current 0/10, worst 7/10, best 0/10   Location: right knee   Description: Sharp and Nagging  Aggravating Factors: Bending, Lifting, Running, Squatting, Twisting  Easing Factors: ice and rest    Pts goals: decrease pain, return to running and working out    Objective     Sensation:  Sensation is intact to light touch    (WFL: Within Functional Limits)     ROM   Right (degrees) Left (degrees)   Knee Flexion (140) 140 140   Knee Extension (0) 0 0   Hip Flexion (125) WFL WFL   Hip Extension (15) WFL WFL   Hip Internal Rotation (45) WFL WFL   Hip External Rotation (45) WFL WFL   Hip Abduction (45) WFL WFL   Plantar Flexion (50) WFL WFL   Dorsiflexion (20) WFL WFL   Ankle Inversion (35) WFL WFL   Ankle Eversion (25) WFL WFL     Joint Mobility   Right Left    Tibial Posterior Glide  Normal Normal   Tibial Anterior Glide  Normal Normal   Patellar Superior Glide  Normal Normal   Patellar Inferior Glide  Normal Normal   Patellar Medial Glide  Normal Normal   Patellar Lateral Glide  Normal Normal     Strength   Right    Left   Gluteus [place] 4+/5 4+/5   Gluteus Medius 4/5 4/5   Psoas 4+/5 5/5   Quadriceps 4+/5 5/5   Hamstrings 4+/5 5/5   Anterior Tibialis 5/5 5/5   Gastroc/Soleus 5/5 5/5   Transverse Abdominis 4/5 4/5     Special Tests:   Test Right Left   Apley negative negative   Cookie negative negative   Pivot Shift negative negative   Anterior Drawer  negative negative   Posterior Drawer  negative negative   Valgus Stress Test negative negative   Varus Stress Test negative negative   Patella Apprehension Test negative negative     Muscle Length: normal hamstring length         Palpation: tender around medial patella     Movement Analysis: Slight knee valgus with the  right knee; unable to maintain arch with heel raise     Gait Analysis: The patient ambulated with the use of none and presents with the following gait abnormalities: none    Function:     Other: Pt presents with postural abnormalities which include: ankle/foot pronation       CMS Impairment/Limitation/Restriction for FOTO Knee Survey    Therapist reviewed FOTO scores for Tyrel Rogers on 2/19/2020.   FOTO documents entered into [email or web address] - see Media section.    Limitation Score: 28%  Category: Mobility         TREATMENT   Treatment Time In: 11:00am  Treatment Time Out: 11:15am  Total Treatment time separate from Evaluation: 15 minutes    Vamsi received therapeutic exercises to develop strength, endurance, ROM, flexibility and core stabilization for 15 minutes including:  Hip 3-way 10x each red band  Arch Lift 2x30  Toe Yoga 30x    Vamsi received the following manual therapy techniques: Joint mobilizations, Myofacial release and Soft tissue Mobilization were applied to the: right knee for 0 minutes, including:    Vamsi participated in neuromuscular re-education activities to improve: Balance, Coordination, Sense, Proprioception and Posture for 0 minutes. The following activities were included:      Home Exercises and Patient Education Provided    Education provided:   - Hip 3-way, toe yoga, and arch lifts    Written Home Exercises Provided: yes.  Exercises were reviewed and Vamsi was able to demonstrate them prior to the end of the session.  Vamsi demonstrated good  understanding of the education provided.     See EMR under Patient Instructions for exercises provided 2/19/2020.    Assessment   Tyrel is a 34 y.o. male referred to outpatient Physical Therapy with a medical diagnosis of M76.51 (ICD-10-CM) - Patellar tendinitis of right knee. The patient presents with impairments which include decreased strength, impaired coordination, postural abnormalities and decreased overall function.  These impairments  are limiting patient's ability to run, squat, workout, and twist. Pt prognosis is Excellent due to personal factors and co-morbidities listed below. Pt will benefit from skilled outpatient Physical Therapy to address the deficits stated above and in the chart below, provide pt/family education, and to maximize pt's level of independence.      Plan of care discussed with patient: Yes  Pt's spiritual, cultural and educational needs considered and patient is agreeable to the plan of care and goals as stated below:     Anticipated Barriers for therapy: none    Medical Necessity is demonstrated by the following  History  Co-morbidities and personal factors that may impact the plan of care Co-morbidities:   none    Personal Factors:   no deficits     low   Examination  Body Structures and Functions, activity limitations and participation restrictions that may impact the plan of care Body Regions:   lower extremities    Body Systems:    strength  gross coordinated movement    Participation Restrictions:   Workouts and Running    Activity limitations:   Learning and applying knowledge  no deficits    General Tasks and Commands  no deficits    Communication  no deficits    Mobility  walking    Self care  no deficits    Domestic Life  no deficits    Interactions/Relationships  no deficits    Life Areas  no deficits    Community and Social Life  recreation and leisure         low   Clinical Presentation stable and uncomplicated low   Decision Making/ Complexity Score: low     Goals:  Short Term Goals: 4 weeks   1. Recent signs and systems trend is improving in order to progress towards LTG's.  2. Patient will be independent with HEP in order to further progress and return to maximal function.  3. Pain rating at Worst: 3/10 in order to progress towards increased independence with activity.    Long Term Goals: 8 weeks   1. Patient will return to normal ADL, recreational, and work related activities with less pain and limitation.    2. Patient will improve glute med and psoas strength to 5/5 in order to return to running  3. Patient will improve quadriceps and hamstrings strength to 5/5 in order to return to working out.   4. Patient will self report improvement to 15% limitation on the FOTO Knee Survey.     Plan   Plan of care Certification: 2/19/2020 to 4/19/2020.    Outpatient Physical Therapy 2 times weekly for 8 weeks to include the following interventions: Electrical Stimulation IFC, Gait Training, Manual Therapy, Moist Heat/ Ice, Neuromuscular Re-ed, Patient Education, Therapeutic Activites and Therapeutic Exercise.     Connor Gallegos, PT, DPT

## 2020-03-10 ENCOUNTER — CLINICAL SUPPORT (OUTPATIENT)
Dept: REHABILITATION | Facility: HOSPITAL | Age: 35
End: 2020-03-10
Payer: COMMERCIAL

## 2020-03-10 DIAGNOSIS — M76.51 PATELLAR TENDINITIS OF RIGHT KNEE: ICD-10-CM

## 2020-03-10 DIAGNOSIS — M25.561 ACUTE PAIN OF RIGHT KNEE: ICD-10-CM

## 2020-03-10 PROCEDURE — 97110 THERAPEUTIC EXERCISES: CPT

## 2020-03-10 PROCEDURE — 97112 NEUROMUSCULAR REEDUCATION: CPT

## 2020-03-10 NOTE — PROGRESS NOTES
Physical Therapy Daily Treatment Note     Name: Tyrel [place]  Clinic Number: [id number]    Therapy Diagnosis:   Encounter Diagnoses   Name Primary?    Acute pain of right knee     Patellar tendinitis of right knee      Physician: Davey Oconnor, *    Visit Date: 3/10/2020    Physician Orders: PT Eval and Treat   Medical Diagnosis from Referral: M76.51 (ICD-10-CM) - Patellar tendinitis of right knee  Evaluation Date: 2/19/2020  Authorization Period Expiration: 2/19/2021  Plan of Care Expiration: 4/19/2020  Visit # / Visits authorized: 1/16 (2)    Time In: 4:20pm  Time Out: 5:25pm  Total Billable Time: 52 minutes    Precautions: Standard    Subjective     Pt reports: His knee is feeling good today. He states he ran 3 times since last treatment.He states the first 2 times it bothered him some but the third time it didn't bother him He states he was running on uneven ground so that may have had something to do with it. He states overall today he isn't having any pain and he is feeling better. He also states he has been doing the exercises at home.   He was compliant with home exercise program.  Response to previous treatment: good  Functional change: none    Pain:  Current 0/10, worst 7/10, best 0/10   Location: right knee   Description: Sharp and Nagging    Objective     Vamsi received therapeutic exercises to develop strength, endurance, ROM, flexibility and core stabilization for 30 minutes including:  Bike 10 minutes for ROM and muscle endurance   Hip 3-way 10x each green band  Arch Lift with Heel Raises 3x12  Lunges 3x10  Hamstring Curls with Yoga Ball 3x10  Single Leg Malian Deadlift 3x8 7.5#  Leg Press Single Leg 3x12     Vamsi received the following manual therapy techniques: Joint mobilizations, Myofacial release and Soft tissue Mobilization were applied to the: right knee for 0 minutes, including:     Vamsi participated in neuromuscular re-education activities to improve: Balance,  Coordination, Sense, Proprioception and Posture for 22 minutes. The following activities were included:  MOBO Balance 2x30 on right  Single Leg Rebounder Throws 3x15  Quad Stretch [id number]  Gastroc Stretch [id number]  Hamstring Stretch [id number]     See EMR under Patient Instructions for exercises provided 2/19/2020.    Assessment     Hip 3 way was progressed to green theraband and patient was provided a new band. More exercises were added for strengthening and stretching. Patient tolerated treatment well today. Patient had some discomfort in knee but once technique was corrected patient had symptom relief.     Vamsi is progressing well towards his goals.   Pt prognosis is Excellent.     Pt will continue to benefit from skilled outpatient physical therapy to address the deficits listed in the problem list box on initial evaluation, provide pt/family education and to maximize pt's level of independence in the home and community environment.     Pt's spiritual, cultural and educational needs considered and pt agreeable to plan of care and goals.     Anticipated barriers to physical therapy: none    Goals:  Short Term Goals: 4 weeks   1. Recent signs and systems trend is improving in order to progress towards LTG's.  2. Patient will be independent with HEP in order to further progress and return to maximal function.  3. Pain rating at Worst: 3/10 in order to progress towards increased independence with activity.     Long Term Goals: 8 weeks   1. Patient will return to normal ADL, recreational, and work related activities with less pain and limitation.   2. Patient will improve glute med and psoas strength to 5/5 in order to return to running  3. Patient will improve quadriceps and hamstrings strength to 5/5 in order to return to working out.   4. Patient will self report improvement to 15% limitation on the FOTO Knee Survey.     Plan     Continue with physical therapy as planned.    Plan of care Certification: 2/19/2020  to 4/19/2020.     Initial: Outpatient Physical Therapy 2 times weekly for 8 weeks to include the following interventions: Electrical Stimulation IFC, Gait Training, Manual Therapy, Moist Heat/ Ice, Neuromuscular Re-ed, Patient Education, Therapeutic Activites and Therapeutic Exercise.     Connor Gallegos, PT, DPT

## 2020-03-11 ENCOUNTER — TELEPHONE (OUTPATIENT)
Dept: ORTHOPEDICS | Facility: CLINIC | Age: 35
End: 2020-03-11

## 2020-03-11 NOTE — TELEPHONE ENCOUNTER
1st attempt to contact patient regarding scheduled appointment. Patient did not answer. Left voicemail for patient to return call at  [phone] to reschedule appointment. Provider will not be in office at that time. TAZ

## 2020-03-19 ENCOUNTER — TELEPHONE (OUTPATIENT)
Dept: REHABILITATION | Facility: HOSPITAL | Age: 35
End: 2020-03-19

## 2020-03-19 NOTE — TELEPHONE ENCOUNTER
Patient: Tyrel Rogers  Date: 3/19/2020  Diagnosis: No diagnosis found.  MRN: [id number]  Spoke with patient due to therapy following updates regarding COVID-19 closely and taking every precaution to ensure the safety of our patients, staff and community. In an abundance of caution and in an effort to help reduce risk and limit community spread, we have decided to temporarily postpone appointments for patients who may be at increased risk to attend in-person therapy or where therapy is not critically needed at this time. Plan of care and home exercise program were reviewed and patient has what they need to continue therapy at home. All patient questions were answered. Also stated to patient that we are exploring virtual methods of providing care and will be in touch over the next few weeks. Patient verbalized understanding to all.    Connor Gallegos, PT, DPT  3/19/2020

## 2020-03-24 ENCOUNTER — TELEPHONE (OUTPATIENT)
Dept: ORTHOPEDICS | Facility: CLINIC | Age: 35
End: 2020-03-24

## 2020-03-24 NOTE — TELEPHONE ENCOUNTER
Left message for pt to call back regarding his apt on 4/17, pt need to either reschedule his apt or switch to TM due to trying to decrease the spread of virus.

## 2020-03-25 ENCOUNTER — TELEPHONE (OUTPATIENT)
Dept: ORTHOPEDICS | Facility: CLINIC | Age: 35
End: 2020-03-25

## 2020-05-13 ENCOUNTER — TELEPHONE (OUTPATIENT)
Dept: ORTHOPEDICS | Facility: CLINIC | Age: 35
End: 2020-05-13

## 2020-05-13 ENCOUNTER — TELEPHONE (OUTPATIENT)
Dept: REHABILITATION | Facility: HOSPITAL | Age: 35
End: 2020-05-13

## 2020-05-13 NOTE — TELEPHONE ENCOUNTER
Patient was called today to see if he would like to return to the clinic. He states his knee is feeling much better and he would like to return but he would need to call back next week when he figures out his work schedule.

## 2020-05-14 ENCOUNTER — TELEPHONE (OUTPATIENT)
Dept: ORTHOPEDICS | Facility: CLINIC | Age: 35
End: 2020-05-14

## 2020-06-16 ENCOUNTER — DOCUMENTATION ONLY (OUTPATIENT)
Dept: REHABILITATION | Facility: HOSPITAL | Age: 35
End: 2020-06-16

## 2020-06-16 DIAGNOSIS — M25.561 ACUTE PAIN OF RIGHT KNEE: ICD-10-CM

## 2020-06-16 DIAGNOSIS — M76.51 PATELLAR TENDINITIS OF RIGHT KNEE: Primary | ICD-10-CM

## 2020-06-16 NOTE — PROGRESS NOTES
Outpatient Therapy Discharge Summary     Name: Tyrel Rogers  Clinic Number: [id number]    Therapy Diagnosis:        Encounter Diagnoses   Name Primary?    Acute pain of right knee      Patellar tendinitis of right knee       Physician: Davey Oconnor, *     Physician Orders: PT Eval and Treat   Medical Diagnosis from Referral: M76.51 (ICD-10-CM) - Patellar tendinitis of right knee  Evaluation Date: 2/19/2020    Date of Last visit: 3/10/2020  Total Visits Received: 2  Cancelled Visits: 5 (Not Counting COVID 19)  No Show Visits: 0    Assessment    Goals:  Short Term Goals: 4 weeks NOT MEASURED  1. Recent signs and systems trend is improving in order to progress towards LTG's.  2. Patient will be independent with HEP in order to further progress and return to maximal function.  3. Pain rating at Worst: 3/10 in order to progress towards increased independence with activity.     Long Term Goals: 8 weeks NOT MEASURED  1. Patient will return to normal ADL, recreational, and work related activities with less pain and limitation.   2. Patient will improve glute med and psoas strength to 5/5 in order to return to running  3. Patient will improve quadriceps and hamstrings strength to 5/5 in order to return to working out.   4. Patient will self report improvement to 15% limitation on the FOTO Knee Survey.     Discharge reason: Patient has not attended therapy since 3/10/2020. Patient was doing well and decided to discontinue therapy due to COVID 19. Patient has returned to full activity.     Plan   This patient is discharged from Physical Therapy

## 2021-04-28 ENCOUNTER — PATIENT MESSAGE (OUTPATIENT)
Dept: RESEARCH | Facility: HOSPITAL | Age: 36
End: 2021-04-28

## 2022-03-06 PROBLEM — R74.8 ABNORMAL LIVER ENZYMES: Status: ACTIVE | Noted: 2017-01-20

## 2022-03-06 PROBLEM — L64.9 MALE PATTERN BALDNESS: Status: ACTIVE | Noted: 2017-10-23

## 2024-04-12 NOTE — TELEPHONE ENCOUNTER
1st attempt to reschedule MRI and MRI review with Dr. Oconnor. Left a message for the patient to give the office a call back to reschedule.FP        ----- Message from Davey Oconnor MD sent at 7/2/2019  2:59 PM CDT -----  MRI was canceled, reach out to see if rescheduling and reschedule his mri follow up visit     Yes...